# Patient Record
(demographics unavailable — no encounter records)

---

## 2018-01-01 NOTE — NBPN
===========================

Datetime: 2018 07:57

===========================

   

Nsy Prov Gen Appearance:  Within Normal Limits

Nsy Prov Skin:  Within Normal Limits

Nsy Prov Neuro:  Normal Tone; [place]; Grasp; Root; Suck

Nsy Prov Musculoskeletal:  Within Normal Limits; Full Range of Motion; Spontaneous Movement All Extre
mities; Intact Clavicles; Clavicles without Crepitus; Gluteal Folds Symmetrical; Spine Within Normal 
Limits; No Sacral Dimple/Cyst

Nsy Prov Head:  Normal Fontanelles; Normocephalic; Sutures WNL

Nsy Prov EENT:  Mouth Within Normal Limits; Ears Within Normal Limits; Eyes Within Normal Limits; Eye
s Red Reflex Bilaterally; Nose Within Normal Limits; Face Within Normal Limits

Nsy Prov Cardiovascular:  Within Normal Limits; Normal Pulses

Nsy Prov Respiratory:  Within Normal Limits

Nsy Prov GI:  Within Normal Limits; Soft; Normal Liver; Non Palpable Spleen; Patent Anus

Nsy Prov Umbilicus:  Within Normal Limits; Three Vessel Cord

Nsy Prov :  Normal Male Genitalia

Nsy Prov Impression:  Healthy Term [place]; Vital Signs Appropriate; Bonding Appropriately; Voiding a
nd Stooling

Nsy Prov Plan:  Continue  Care

Nsy Prov Impression/Plan Details:  Well baby boy.

   

===========================

Datetime: 2018 13:47

===========================

   

Nsy Prov HEENT Details:  tongue-tie

## 2018-01-01 NOTE — NBADN
===========================

Datetime: 2018 13:47

===========================

   

Nsy Prov Gen Appearance:  Within Normal Limits

Nsy Prov Gen Appearance:  Within Normal Limits

Nsy Prov Skin:  Within Normal Limits

Nsy Prov Neuro:  Normal Tone; [place]; Grasp; Root; Suck

Nsy Prov Musculoskeletal:  Within Normal Limits; Full Range of Motion; Spontaneous Movement All Extre
mities; Intact Clavicles; Clavicles without Crepitus; Gluteal Folds Symmetrical; Spine Within Normal 
Limits; No Sacral Dimple/Cyst

Nsy Prov Head:  Normal Fontanelles; Normocephalic; Sutures WNL

Nsy Prov EENT:  Mouth Within Normal Limits; Ears Within Normal Limits; Eyes Within Normal Limits; Eye
s Red Reflex Bilaterally; Nose Within Normal Limits; Face Within Normal Limits

Nsy Prov Cardiovascular:  Within Normal Limits; Normal Pulses

Nsy Prov Respiratory:  Within Normal Limits

Nsy Prov GI:  Within Normal Limits; Soft; Normal Liver; Non Palpable Spleen; Patent Anus

Nsy Prov Umbilicus:  Within Normal Limits; Three Vessel Cord

Nsy Prov :  Normal Male Genitalia

Nsy Prov HEENT Details:  tongue-tie

Nsy Prov Impression:  Healthy Term ; Vital Signs Appropriate; Bonding Appropriately

Nsy Prov Plan:  Continue [place] Care

Nsy Prov Impression/Plan Details:  Post-dates well male, C/S

   

===========================

Datetime: 2018 12:22

===========================

   

Method of Delivery:  

Infant Birthdate and Time:  2018 10:55

Gestational Age at Deliv:  41.0

Infant Sex - 1:  Male

Fetal Presentation:  Cephalic

Apgar Score 1, NB:  9

Apgar Score5, NB:  9

Mother's PT-AGE:  35

Mother's :  9

Mother's Para:  2

Mother's :  0

Mother's Abortions Induced:  6

Mother's Abortions Sponteneous:  0

Mother's Livin

Mother's Primary Language MBL:  English

Mother's Blood Type:  O NEG

Mother's Group B Beta Strep:  Negative

Mother's Hepatitis B:  Negative

Mother's Gonorrhea:  Negative

Mothers Chlamydia MBL:  Negative

Mother's Herpes Simplex:  Positive (no active lesions)

Mother's Rubella:  Immune

Mother's Tobacco Use MBL:  Former Smoker. [id number]

Mother's Marijuana MBL:  No

Mother's Alcohol MBL:  No

Mother's Cocaine/Crack MBL:  No

Mother's Illicit Drugs MBL:  No

Mothers Comments ACOG Med Hx MBL:  Previous  x 2, Hernia repair

      

   Family Hx of hyptertension, maternal sister has seizures, heart murmur

Mother's Term:  2

Length of Rupture NB:  0.02

Admission Birthweight, NB:  3540

Infant Weight (lb) MBL:  7

Infant Weight (oz) MBL:  13

Mother's Primary Indication:  Nonreassuring Fetal Status

Mother's HIV+ Exposure Test MBL:  Negative

Mother's Steroids Given:  None

Mother's Steroids Not Admin:  Not Applicable

Mother's Anesthesia Labor:  None

Mother's Delivery Anesthesia:  Epidural

Mother's Intrapartum Maternal Co:  None

Infant Cord Vessels:  3

Mother's RPR/VDRL:  Nonreactive

Mother's Marital Status:  SINGLE

Mother's Rule Inc Maternal Age:  Age <=35 at [facility]

Mother's Rule Thalassemia:  No History of Thalassemia

Mother's Rule Neural Tube Defect:  No History of Neural Tube Defect 

Mother's Rule Congenital Heart:  No History of Congenital Heart Disease

Mother's Rule Down Syndrome:  No History of Down Syndrome

Mother's Rule Kev-Sachs:  No History of Kev-Sachs

Mother's Rule [place]:  No History of [place]

Mother's Rule Familial Dysauto:  No History of Familial Dysautonomia

Mother's Rule Sickle Cell:  No History of Sickle Cell Disease/Trait

Mother's Rule Hemophilia:  No History of Hemophilia/Blood Disorder

Mother's Rule Muscular Dystrophy:  No History of Muscular Dystrophy 

Mother's Rule Cystic Fibrosis:  No History of Cystic Fibrosis

Mother's Rule [place]'s Chor:  No History of [place]'s Chorea

Mother's Rule Mental Retardation:  No History of Mental Retardation/Autism

Mother's Rule Fragile X:  No History of Fragile X Testing

Mother's Rule Oth Inherited DO:  No History of Other Inherited/Chromosomal Disorders

Mother's Rule Maternal Metabolic:  No History of  Maternal Metabolic

Mother's Rule FOB Birth Defects:  No History of Pt Father or FOB Birth Defects

Mother's Rule Hx Stillborn MBL:  No History of Loss/Stillborn

Mother's Rule Other Genetic Hx:  No Other Genetic History

Mother's Rule Drugs/Medications:  No History of Drugs/Medications

Mother's Rule Gonorrhea:  No History of Gonorrhea

Mother's Rule Chlamydia:  No History of Chlamydia

Mother's Rule Syphilis:  No History of Syphilis

Mother's Rule HIV/AIDS Exp:  No History of HIV/Aids Exposure

Mother's Rule HPV:  No History of Human Papillomavirus

Mother's Rule Genital Herpes:  Genital Herpes

Mother's Rule TB:  No History of Tuberculosis

Mother's Rule Hepatitis:  No History of Hepatitis

Mother's Rule Rash or Viral Ill:  No History of Rash or Viral Illness

Mother's Rule Diabetes:  No History of Diabetes

Mother's Rule Hypertension MBL:  No History of Hypertension

Mother's Rule Heart Disease:  No History of Heart Disease

Mother's Rule Autoimmune:  No History of Autoimmune Disorder

Mother's Rule Kidney Disease:  No History of Kidney Disease/UTI

Mother's Rule Neurologic:  No History of Neurologic/Epilepsy Disorders

Mother's Rule Psych Disorders:  No History of Psychiatric Disorder

Mother's Rule Depression/PP Dep:  No History of Depression/Postpartum Depression

Mother's Rule Hepaitis/tLiver:  No History of Hepatitis/Liver Disease

Mother's Rule Varicos/Phlebitis:  No History of Varicosities/Phlebitis

Mother's Rule Thyroid Dysfunct:  No History of Thyroid Dysfunction

Mother's Rule Trauma/Violence:  No History of Trauma/Violence

Mother's Rule Blood Transfusion:  No History of Blood Transfusions

Mother's Rule Sensitization:  No History of D (Rh) Sensitization

Mother's Rule Pulmonary:  No History of Pulmonary (Asthma, TB)

Mother's Rule Breast:  No Breast History

Mother's Rule Gyn Surgery:  No History of Gyn Surgery

Mother's Rule Hosp/Surgery:  Hospitalization/Surgery

Mother's Rule Anesthetic Comp:  No History of Anesthetic Complications

Mother's Rule Abnormal Pap:  No History of Abnormal Pap Smear

Mother's Rule Uterine Anomaly:  No History of Uterine Anomaly/LASHANDA

Mother's Rule Infertility:  No History of Infertility

Mother's Rule ART Treatment:  No History of ART Treatment

Mother's Rule Other Med Disease:  No History of Other Medical Diseases

Mother's Rule Family History:  No Significant Family History

   

===========================

Datetime: 2018 11:45

===========================

   

Admit From NB:  Operating Room

Admit Date and Time, NB:  2018 11:45 (Annotations: TOB [id number])

Weight Admission (gms), NB:  3540

Weight Admission (lbs), NB:  7

Weight Admission (oz) NB:  13

Length Admission (in), NB:  20.67

Head Circumference Adm (cm), NB:  35.00

Head circumference Adm (in), NB:  13.78

Chest Circumference Adm (cm), NB:  34.00

Abdominal Circumference Adm (cm):  29.50

Length Admission (cm), NB:  52.50

## 2018-01-01 NOTE — DELATT
===========================

Datetime: 2018 13:46

===========================

   

Del Note Departure Status:   Nursery

Del Note Status:  well baby

Del Note Interventions Oth:  C/S for failed induction.

      

Del Note Interventions:  Assessment; Stimulation; Drying

Del Note Reason for Attending:   Section

MARBELLA/NICU Del Atten Note Adm DT:  2018 13:47

   

===========================

Datetime: 2018 12:22

===========================

   

Apgar Score 1, NB:  9

Apgar Score5, NB:  9

## 2018-01-01 NOTE — NBCIR
===========================

Datetime: 2018 13:46

===========================

   

Preformed by::  MAHNAZ Dubon DO

Consent Signed:  Written Consent Signed and on Chart

Position:  Supine; Papoose Board

Circumcision Time Out:  Correct Patient Identity; Correct Side and Site are Marked; Accurate Procedur
e Consent Form; Agreement on Procedure to be Done; Correct Patient Position

Site Prep:  Povidine Iodine

Circumcision Date/Time:  2018 11:15

Block/Anesthestics:  Emla Cream

Equipment Used:  Gomco Clamp

Bell Size:  1.3

Systemic Medications:  Oral Medication

Other Systemic Medications:  Sweet Ease

Complications:  None

Status:  Excellent Cosmetic Outcome; Tolerated Procedure Well; Hemostatic

Parents Present:  None

Procedure Note:  Mother reuqested cirucmcision to be performed.  Informed consent obtained prior to p
rocedure.  Infant tolerated well.

   

===========================

Datetime: 2018 12:22

===========================

   

Circumcision Request:  Yes

   

===========================

Datetime: 2018 11:12

===========================

   

PT-NAME:  MILTON, BABY BOY OF CECILE

## 2018-01-01 NOTE — NBDCN
===========================

Datetime: 2018 14:05

===========================

   

Nsy Prov Gen Appearance:  Within Normal Limits

Nsy Prov Skin:  Within Normal Limits; Jaundice

Nsy Prov Neuro:  Normal Tone; [place]; Grasp; Root; Suck

Nsy Prov Musculoskeletal:  Within Normal Limits; Full Range of Motion; Spontaneous Movement All Extre
mities; Intact Clavicles; Clavicles without Crepitus; Gluteal Folds Symmetrical; Spine Within Normal 
Limits; No Sacral Dimple/Cyst

Nsy Prov Head:  Normal Fontanelles; Normocephalic; Sutures WNL

Nsy Prov EENT:  Mouth Within Normal Limits; Ears Within Normal Limits; Eyes Within Normal Limits; Eye
s Red Reflex Bilaterally; Nose Within Normal Limits; Face Within Normal Limits

Nsy Prov Cardiovascular:  Within Normal Limits; Normal Pulses

Nsy Prov Respiratory:  Within Normal Limits

Nsy Prov GI:  Within Normal Limits; Soft; Normal Liver; Non Palpable Spleen; Patent Anus

Nsy Prov Umbilicus:  Within Normal Limits; Three Vessel Cord

Nsy Prov :  Normal Male Genitalia

Nsy Prov Discharge:  Discharge Home Today; Healthy Term [place]; Vital Signs Appropriate; Bonding [place]
ropriately; Voiding and Stooling; Appropriate Weight Loss; Follow Bilirubin Values

Nsy Prov Disch Comments:  POST-DATES, JAUNDICE MALE, BORN VIA C/S.

   PLAN OF CARE DISCUSSED WITH MOTHER.

Follow up in Weeks NB:  2- 3DAYS

   

===========================

Datetime: 2018 09:00

===========================

   

Formula Type:  Similac Advance

   

===========================

Datetime: 2018 08:00

===========================

   

[place] Screenin2018 08:00

   

===========================

Datetime: 2018 20:00

===========================

   

Blood Type:  O Negative

Lab, Direct [place]:  Negative

   

===========================

Datetime: 2018 12:00

===========================

   

Congenital Heart Screen:  Negative, Congenital Heart Screen Complete

   

===========================

Datetime: 2018 08:00

===========================

   

Hearing Screen Result, NB:  Right Ear Pass; Left Ear Pass

Hearing Screen Status:  Hearing Screen Complete

   

===========================

Datetime: 2018 13:47

===========================

   

Nsy Prov HEENT Details:  tongue-tie

   

===========================

Datetime: 2018 13:46

===========================

   

Discharge Weight gms NB:  3525

Discharge Weight lbs NB:  7

Discharge Weight oz NB:  12

Circumcision Equipment:  Gomco Clamp

Circumcision Date/Time:  2018 11:15

Disch Follow Up With:  PMD

Follow up Appt with NB:  Office

   

===========================

Datetime: 2018 12:22

===========================

   

Infant Birthdate and Time:  2018 10:55

Infant Sex - 1:  Male

Gestational Age at [facility]:  41.0

Method of Delivery:  

Vacuum Extraction:  N/A

Forceps:  N/A

Mother's Steroids Given:  None

Apgar Score 1, NB:  9

Apgar Score5, NB:  9

Maternal Amniotic Fluid Color:  Clear

Mother's Blood Type:  O NEG

Mother's Hepatitis B:  Negative

Mother's Gonorrhea:  Negative

Mother's Chlamydia:  Negative

Mother's RPR/VDRL:  Nonreactive

Mother's HIV+ Exposure Test MBL:  Negative

Mother's Hx Herpes:  Yes

Mother's Rubella:  Immune

Mother's Group Beta Strep:  Negative

Admission Birthweight, NB:  3540

Infant Weight (lb) MBL:  7

Infant Weight (oz) MBL:  13

Maternal Feeding Preference:  Breast

   

===========================

Datetime: 2018 11:45

===========================

   

Length cms, NB:  52.50

Length in, NB:  20.67

Head Circumference (cm), NB:  35.00

Chest Circumference, NB:  34.00

## 2018-01-01 NOTE — NBPN
===========================

Datetime: 2018 08:50

===========================

   

Nsy Prov Gen Appearance:  Within Normal Limits

Nsy Prov Skin:  Within Normal Limits

Nsy Prov Neuro:  Normal Tone; [place]; Grasp; Root; Suck

Nsy Prov Musculoskeletal:  Within Normal Limits; Full Range of Motion; Spontaneous Movement All Extre
mities; Intact Clavicles; Clavicles without Crepitus; Gluteal Folds Symmetrical; Spine Within Normal 
Limits; No Sacral Dimple/Cyst

Nsy Prov Head:  Normal Fontanelles; Normocephalic; Sutures WNL

Nsy Prov EENT:  Mouth Within Normal Limits; Ears Within Normal Limits; Eyes Within Normal Limits; Eye
s Red Reflex Bilaterally; Nose Within Normal Limits; Face Within Normal Limits

Nsy Prov Cardiovascular:  Within Normal Limits; Normal Pulses

Nsy Prov Respiratory:  Within Normal Limits

Nsy Prov GI:  Within Normal Limits; Soft; Normal Liver; Non Palpable Spleen; Patent Anus

Nsy Prov Umbilicus:  Within Normal Limits; Three Vessel Cord

Nsy Prov :  Normal Male Genitalia

Nsy Prov Impression:  Healthy Term [place]; Vital Signs Appropriate; Bonding Appropriately; Voiding a
nd Stooling

Nsy Prov Plan:  Continue  Care

Nsy Prov Impression/Plan Details:  Well baby boy.